# Patient Record
(demographics unavailable — no encounter records)

---

## 2025-01-10 NOTE — DISCUSSION/SUMMARY
[With Me] : with me [___ Month(s)] : in [unfilled] month(s) [EKG obtained to assist in diagnosis and management of assessed problem(s)] : EKG obtained to assist in diagnosis and management of assessed problem(s) [FreeTextEntry1] : Veena is a 52 year old female with hypothyroidism, former smoker, who initially presented to [facility] in 6/2018 with syncope and shortness of breath. She was found to have extensive bilateral PE with evidence of right heart involvement. She was admitted to the [facility], and underwent catheter assisted thrombectomy.  She has since recovered quite well.   She arrives today for cardiac clearance in preparation of Meniscus repair She has no worrisome symptoms at this time and her exercise tolerance is limited by musculoskeletal issues, particularly her knee.  ECG illustrates sinus rhythm, her B/P is not optimal but improved some by the conclusion of the visit.  Coronary CTA showed Multifocal calcified plaque in the RCA and LAD, with a calcium score of 80, though no significant epicardial stenosis. She will continue atorvastatin 10mg daily for goal LDL <70 and ASA for primary prevention every other day. She will have fasting lipid panel updated.  Latest echocardiogram demonstrates normal RV/LV function with normal LVEF. We will keep an eye on her B/Ps. Levothyroxine for the management of hypothyroidism.  There is no cardiac or medical contraindication to proceeding. She is able to stop her Eliquis 2 days hours prior to the procedure, and will restart when deemed safe from a bleeding perspective.  Routine cardiac monitoring is recommended.  Again, we stressed the importance of refraining from smoking, along with diet, weight loss and exercise.  She will follow up with me in 3 months.

## 2025-01-10 NOTE — REVIEW OF SYSTEMS
[Joint Pain] : joint pain [Joint Swelling] : joint swelling [Joint Stiffness] : joint stiffness [Negative] : Heme/Lymph [FreeTextEntry9] : KNEE

## 2025-01-10 NOTE — HISTORY OF PRESENT ILLNESS
[FreeTextEntry1] : Mrs. Richards is a 52 year old female with hypothyroidism, former smoker, DVT/PE (23 yrs ago on OCP, smoker in setting of severe pleurisy/PNA), herniated disc in back causing nerve compression and limiting mobility since 3/2018 s/p lumbar epidural steroid injection 6/15/18, who initially presented to [facility] in 6/2018 with syncope and shortness of breath. She was found to have extensive bilateral PE with evidence of right heart involvement. She was admitted to the [facility], and underwent catheter assisted thrombectomy. She was also found to have LLE DVT Her hemodynamics improved significantly, and she was continued on therapeutic Lovenox. Hospital course was complicated by wheezing in the setting of parainfluenza virus.  In 2020, CT that showed mild atelectasis. He also commented on coronary calcium, specifically of the LAD and RCA. I subsequently sent her for a coronary CTA. Multifocal calcified plaque was noted in the RCA and LAD, with a calcium score of 80. There was no significant epicardial coronary artery stenosis. The scan also demonstrated prominent bronchial arteries to suggest a small shunts, possibly related to increased pulmonary artery pressures. Echocardiogram with normal LV function in 9/2021.  She was last seen in 7/2023 At last visit, I cleared her for knee replacement. She never had this done, because they cancelled x 2. 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 previously on Topomax for nerve pain, also tried cymbalta but did not tolerate, causing weight gain (30 pounds)  She now arrives today for cardiac clearance in preparation for Meniscus repair scheduled for 1/22/25 with Dr Fredy Law at [facility]. She is doing well. She has no chest pain or difficulty breathing.  She denies dizziness, lightheadedness, near syncope and palpitations. She takes lasix rarely.

## 2025-01-10 NOTE — DISCUSSION/SUMMARY
[With Me] : with me [___ Month(s)] : in [unfilled] month(s) [EKG obtained to assist in diagnosis and management of assessed problem(s)] : EKG obtained to assist in diagnosis and management of assessed problem(s) [FreeTextEntry1] : Veena is a 52 year old female with hypothyroidism, former smoker, who initially presented to [facility] in 6/2018 with syncope and shortness of breath. She was found to have extensive bilateral PE with evidence of right heart involvement. She was admitted to the [facility], and underwent catheter assisted thrombectomy.  She has since recovered quite well.   She arrives today for cardiac clearance in preparation of Meniscus repair She has no worrisome symptoms at this time and her exercise tolerance is limited by musculoskeletal issues, particularly her knee.  ECG illustrates sinus rhythm, her B/P is not optimal but improved some by the conclusion of the visit.  Coronary CTA showed Multifocal calcified plaque in the RCA and LAD, with a calcium score of 80, though no significant epicardial stenosis. She will continue atorvastatin 10mg daily for goal LDL <70 and ASA for primary prevention every other day. She will have fasting lipid panel updated.  Latest echocardiogram demonstrates normal RV/LV function with normal LVEF. We will keep an eye on her B/Ps. Levothyroxine for the management of hypothyroidism.  There is no cardiac or medical contraindication to proceeding. She is able to stop her Eliquis 2 days hours prior to the procedure, and will restart when deemed safe from a bleeding perspective.  Routine cardiac monitoring is recommended.  Again, we stressed the importance of refraining from smoking, along with diet, weight loss and exercise.  She will follow up with me in 3 months.

## 2025-01-10 NOTE — ADDENDUM
[FreeTextEntry1] : doing well. no cardiac contraindication to proceeding with meniscus surgery can hold ac 2 days prior

## 2025-01-10 NOTE — HISTORY OF PRESENT ILLNESS
[FreeTextEntry1] : Mrs. Richards is a 52 year old female with hypothyroidism, former smoker, DVT/PE (23 yrs ago on OCP, smoker in setting of severe pleurisy/PNA), herniated disc in back causing nerve compression and limiting mobility since 3/2018 s/p lumbar epidural steroid injection 6/15/18, who initially presented to [facility] in 6/2018 with syncope and shortness of breath. She was found to have extensive bilateral PE with evidence of right heart involvement. She was admitted to the [facility], and underwent catheter assisted thrombectomy. She was also found to have LLE DVT Her hemodynamics improved significantly, and she was continued on therapeutic Lovenox. Hospital course was complicated by wheezing in the setting of parainfluenza virus.  In 2020, CT that showed mild atelectasis. He also commented on coronary calcium, specifically of the LAD and RCA. I subsequently sent her for a coronary CTA. Multifocal calcified plaque was noted in the RCA and LAD, with a calcium score of 80. There was no significant epicardial coronary artery stenosis. The scan also demonstrated prominent bronchial arteries to suggest a small shunts, possibly related to increased pulmonary artery pressures. Echocardiogram with normal LV function in 9/2021.  She was last seen in 7/2023 At last visit, I cleared her for knee replacement. She never had this done, because they cancelled x 2. 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 previously on Topomax for nerve pain, also tried cymbalta but did not tolerate, causing weight gain (30 pounds)  She now arrives today for cardiac clearance in preparation for Meniscus repair scheduled for 1/22/25 with Dr Fredy Law at [facility]. She is doing well. She has no chest pain or difficulty breathing.  She denies dizziness, lightheadedness, near syncope and palpitations. She takes lasix rarely.

## 2025-01-10 NOTE — CARDIOLOGY SUMMARY
[de-identified] : 9/9/21: sr 1/8/2025: sinus rhythm  [de-identified] : 2021: NML LV/Rv, min MR/MAC/min TR [de-identified] : 2018: CTA Cors  calcium score 80 multifocal calcified plaque causes up to midl RCA/LAD stenosis. (90 -100 percentile)

## 2025-01-10 NOTE — CARDIOLOGY SUMMARY
[de-identified] : 9/9/21: sr 1/8/2025: sinus rhythm  [de-identified] : 2021: NML LV/Rv, min MR/MAC/min TR [de-identified] : 2018: CTA Cors  calcium score 80 multifocal calcified plaque causes up to midl RCA/LAD stenosis. (90 -100 percentile)

## 2025-02-27 NOTE — DISCUSSION/SUMMARY
[de-identified] : C5-6 ACDF. L5-S1 degenerative disc disease. Cervical degenerative disc disease. Patient on blood thinners. Rotator cuff tendinitis on the right side.   She is scheduled to have a surgical fluoroscopy of the right knee with Dr. Law Discussed all options. MRI Shoulder  F/U after MRI  She is followed by sports orthopedic surgeon he will review her pelvic MRI to assess the right hip and she will forward me the findings. All options discussed including rest, medicine, home exercise, acupuncture, Chiropractic care, Physical Therapy, Pain management, and last resort surgery. All questions were answered, all alternatives discussed and the patient is in complete agreement with the treatment plan which the patient contributed to and discussed with me through the shared decision making process. Follow-up appointment as instructed. Any issues and the patient will call or come in sooner.

## 2025-02-27 NOTE — HISTORY OF PRESENT ILLNESS
[Stable] : stable [de-identified] : 52 year old female presents for evaluation of chronic neck and lower back pain, last seen in Aug 2024.  She is s/p ACDF several years ago with me. She is a long term patient of mine.  She also receives care for her bilateral hips, knees, and upper extremities by other physicians. She has pain that radiates down the LLE anteriorly to the knee.  She lately has been having pain at the right side of her neck that radiates to the clavicle, trapezius to the shoulder and to the elbow.  She notes that she has pain with lifting her arm.  She takes tylenol for the pain.  Can not take NSAIDs as she is on blood thinners.  She is unsure if her pain is stemming from the neck or shoulder.  She states she has been participating with chiropractic care since 2018 till December 2023 for the neck and lower back. She also has been performing daily cervical and lumbar HEP as taught to her by her chiropractor but has not felt improvement. Has MRI Cervical and MRI Lumbar done in March 2024.  PMHx: history of DVT on AC, S/p carpal and cubital tunnel release in July 2023 with Dr. Mata.

## 2025-02-27 NOTE — PHYSICAL EXAM
[Normal] : Gait: normal [Ashby's Sign] : negative Ashby's sign [Pronator Drift] : negative pronator drift [SLR] : negative straight leg raise [de-identified] : 5 out of 5 motor strength, sensation is intact and symmetrical full range of motion flexion extension and rotation, no palpatory tenderness full range of motion of hips knees shoulders and elbows (all four extremities), no atrophy, negative straight leg raise, no pathological reflexes, no swelling, normal ambulation, no apparent distress skin is intact, no palpable lymph nodes, no upper or lower extremity instability, alert and oriented x3 and normal mood. Normal finger-to nose test. No upper motor neuron findings. + impingement right shoulder.  [de-identified] : XR Left Shoulder 2 views 2/26/25 - adequate-reviewed with patient.   XR AP Lat Cervical 08/26/2024 -C5-6 ACDF, degeneration C4-5, unchanged since 2013- reviewed with patient.  XR AP Lat Lumbar 08/26/2024 -L5-S1 disc degenerative disease, right hip arthritis- reviewed with patient.   I reviewed, interpreted and clinically correlated the following outside imaging studies,    03/19/2024 MRI-CERVICAL SPINE NON CONTRAST (Mae Acevedo) HISTORY: M54.2 Cervical/ Neck Pain M79.602 Left arm pain R20.0 Numbness Lower/Upper Extremity M48.02 Spinal stenosis cervical Technique: Multiplanar, multisequence MR imaging of the cervical spine was performed without the administration of intravenous contrast. Comparison: November 6, 2021 Findings: Alignment: Alignment is preserved. There is reversal of the normal cervical lordosis. Bone marrow: No evidence of metastatic disease or acute vertebral body compression fracture. Craniocervical junction: The craniocervical junction is within normal limits. At C2-3: No significant spinal canal or neural foraminal stenosis. At C3-4: No significant spinal canal or neural foraminal stenosis. At C4-5: There is central disc protrusion mildly narrowing the spinal canal. At C5-6: There is disc bulge and central disc protrusion causing spinal canal stenosis with cord compression. There is severe bilateral neural foraminal stenosis. At C6-7: Status post anterior cervical discectomy and fusion without significant residual spinal canal neural foraminal stenosis. At C7-T1: Mild disc bulge without significant spinal canal or neural foraminal stenosis. There is no significant change from previous study. IMPRESSION: 1. At C4-5: There is central disc protrusion mildly narrowing the spinal canal. 2. At C5-6: There is disc bulge and central disc protrusion causing spinal canal stenosis with cord compression. There is severe bilateral neural foraminal stenosis. 3. At C6-7: Status post anterior cervical discectomy and fusion without significant residual spinal canal neural foraminal stenosis. 4. At C7-T1: Mild disc bulge without significant spinal canal or neural foraminal stenosis.  03/19/2024 MRI-LUMBAR SPINE NON CONTRAST (Mae Pesemperatriz) HISTORY: R20.0 Numbness Lower/Upper Extremity M79.605 Left Leg Pain M62.830 Spasm of back muscles R26.2 Difficulty Walking Technique: Multiplanar, multisequence MRI of the lumbar spine was performed without the administration of intravenous contrast. Findings: Alignment: Alignment is preserved. Conus: The conus is normal in size and signal. Bone marrow: No evidence of metastatic disease or acute vertebral body compression fracture. At L5-S1: There is disc bulge and facet arthropathy. There is moderate bilateral neural foraminal stenosis with disc contacting exiting nerve roots.. There is bilateral subarticular recess stenosis with possible impingement of descending nerve roots. At L4-5: There is a facet arthropathy and disc bulge with mild to moderate bilateral neural foraminal stenosis. There is mild spinal canal and subarticular recess stenosis. At L3-4: There is disc bulge and left foraminal/extra foraminal disc herniation with impingement of exiting left L3 nerve root. At L2-3: No significant spinal canal or neural foraminal stenosis. At L1-2: No significant spinal canal or neural foraminal stenosis. IMPRESSION: 1. At L5-S1: There is disc bulge and facet arthropathy. There is moderate bilateral neural foraminal stenosis with disc contacting exiting nerve roots. There is bilateral subarticular recess stenosis with possible impingement of descending nerve roots. 2. At L4-5: There is facet arthropathy and disc bulge with mild to moderate bilateral neural foraminal stenosis. There is mild spinal canal and subarticular recess stenosis. 3. At L3-4: There is disc bulge and left foraminal/extra foraminal disc herniation with impingement of exiting left L3 nerve root.

## 2025-02-27 NOTE — HISTORY OF PRESENT ILLNESS
[Stable] : stable [de-identified] : 52 year old female presents for evaluation of chronic neck and lower back pain, last seen in Aug 2024.  She is s/p ACDF several years ago with me. She is a long term patient of mine.  She also receives care for her bilateral hips, knees, and upper extremities by other physicians. She has pain that radiates down the LLE anteriorly to the knee.  She lately has been having pain at the right side of her neck that radiates to the clavicle, trapezius to the shoulder and to the elbow.  She notes that she has pain with lifting her arm.  She takes tylenol for the pain.  Can not take NSAIDs as she is on blood thinners.  She is unsure if her pain is stemming from the neck or shoulder.  She states she has been participating with chiropractic care since 2018 till December 2023 for the neck and lower back. She also has been performing daily cervical and lumbar HEP as taught to her by her chiropractor but has not felt improvement. Has MRI Cervical and MRI Lumbar done in March 2024.  PMHx: history of DVT on AC, S/p carpal and cubital tunnel release in July 2023 with Dr. Mata.

## 2025-02-27 NOTE — DISCUSSION/SUMMARY
[de-identified] : C5-6 ACDF. L5-S1 degenerative disc disease. Cervical degenerative disc disease. Patient on blood thinners. Rotator cuff tendinitis on the right side.   She is scheduled to have a surgical fluoroscopy of the right knee with Dr. Law Discussed all options. MRI Shoulder  F/U after MRI  She is followed by sports orthopedic surgeon he will review her pelvic MRI to assess the right hip and she will forward me the findings. All options discussed including rest, medicine, home exercise, acupuncture, Chiropractic care, Physical Therapy, Pain management, and last resort surgery. All questions were answered, all alternatives discussed and the patient is in complete agreement with the treatment plan which the patient contributed to and discussed with me through the shared decision making process. Follow-up appointment as instructed. Any issues and the patient will call or come in sooner.

## 2025-02-27 NOTE — PHYSICAL EXAM
[Normal] : Gait: normal [Ashby's Sign] : negative Ashby's sign [Pronator Drift] : negative pronator drift [SLR] : negative straight leg raise [de-identified] : 5 out of 5 motor strength, sensation is intact and symmetrical full range of motion flexion extension and rotation, no palpatory tenderness full range of motion of hips knees shoulders and elbows (all four extremities), no atrophy, negative straight leg raise, no pathological reflexes, no swelling, normal ambulation, no apparent distress skin is intact, no palpable lymph nodes, no upper or lower extremity instability, alert and oriented x3 and normal mood. Normal finger-to nose test. No upper motor neuron findings. + impingement right shoulder.  [de-identified] : XR Left Shoulder 2 views 2/26/25 - adequate-reviewed with patient.   XR AP Lat Cervical 08/26/2024 -C5-6 ACDF, degeneration C4-5, unchanged since 2013- reviewed with patient.  XR AP Lat Lumbar 08/26/2024 -L5-S1 disc degenerative disease, right hip arthritis- reviewed with patient.   I reviewed, interpreted and clinically correlated the following outside imaging studies,    03/19/2024 MRI-CERVICAL SPINE NON CONTRAST (Mae Acevedo) HISTORY: M54.2 Cervical/ Neck Pain M79.602 Left arm pain R20.0 Numbness Lower/Upper Extremity M48.02 Spinal stenosis cervical Technique: Multiplanar, multisequence MR imaging of the cervical spine was performed without the administration of intravenous contrast. Comparison: November 6, 2021 Findings: Alignment: Alignment is preserved. There is reversal of the normal cervical lordosis. Bone marrow: No evidence of metastatic disease or acute vertebral body compression fracture. Craniocervical junction: The craniocervical junction is within normal limits. At C2-3: No significant spinal canal or neural foraminal stenosis. At C3-4: No significant spinal canal or neural foraminal stenosis. At C4-5: There is central disc protrusion mildly narrowing the spinal canal. At C5-6: There is disc bulge and central disc protrusion causing spinal canal stenosis with cord compression. There is severe bilateral neural foraminal stenosis. At C6-7: Status post anterior cervical discectomy and fusion without significant residual spinal canal neural foraminal stenosis. At C7-T1: Mild disc bulge without significant spinal canal or neural foraminal stenosis. There is no significant change from previous study. IMPRESSION: 1. At C4-5: There is central disc protrusion mildly narrowing the spinal canal. 2. At C5-6: There is disc bulge and central disc protrusion causing spinal canal stenosis with cord compression. There is severe bilateral neural foraminal stenosis. 3. At C6-7: Status post anterior cervical discectomy and fusion without significant residual spinal canal neural foraminal stenosis. 4. At C7-T1: Mild disc bulge without significant spinal canal or neural foraminal stenosis.  03/19/2024 MRI-LUMBAR SPINE NON CONTRAST (Mae Pesemperatriz) HISTORY: R20.0 Numbness Lower/Upper Extremity M79.605 Left Leg Pain M62.830 Spasm of back muscles R26.2 Difficulty Walking Technique: Multiplanar, multisequence MRI of the lumbar spine was performed without the administration of intravenous contrast. Findings: Alignment: Alignment is preserved. Conus: The conus is normal in size and signal. Bone marrow: No evidence of metastatic disease or acute vertebral body compression fracture. At L5-S1: There is disc bulge and facet arthropathy. There is moderate bilateral neural foraminal stenosis with disc contacting exiting nerve roots.. There is bilateral subarticular recess stenosis with possible impingement of descending nerve roots. At L4-5: There is a facet arthropathy and disc bulge with mild to moderate bilateral neural foraminal stenosis. There is mild spinal canal and subarticular recess stenosis. At L3-4: There is disc bulge and left foraminal/extra foraminal disc herniation with impingement of exiting left L3 nerve root. At L2-3: No significant spinal canal or neural foraminal stenosis. At L1-2: No significant spinal canal or neural foraminal stenosis. IMPRESSION: 1. At L5-S1: There is disc bulge and facet arthropathy. There is moderate bilateral neural foraminal stenosis with disc contacting exiting nerve roots. There is bilateral subarticular recess stenosis with possible impingement of descending nerve roots. 2. At L4-5: There is facet arthropathy and disc bulge with mild to moderate bilateral neural foraminal stenosis. There is mild spinal canal and subarticular recess stenosis. 3. At L3-4: There is disc bulge and left foraminal/extra foraminal disc herniation with impingement of exiting left L3 nerve root.

## 2025-02-27 NOTE — ADDENDUM
[FreeTextEntry1] : This note was written by Romana Desir on 02/26/2025 acting as scribe for Dr. Phil Benito M.D.  I, Phil Benito MD, have read and attest that all the information, medical decision making and discharge instructions within are true and accurate.

## 2025-03-26 NOTE — REASON FOR VISIT
[Spouse] : spouse [Home] : at home, [unfilled] , at the time of the visit. [Medical Office: ([facility])___] : at the medical office located in  [Telehealth (audio & video)] : This visit was provided via telehealth using real-time 2-way audio visual technology. [Verbal consent obtained from patient] : the patient, [unfilled] [Follow-Up Visit] : a follow-up visit for

## 2025-03-26 NOTE — REASON FOR VISIT
[Spouse] : spouse [Home] : at home, [unfilled] , at the time of the visit. [Medical Office: ([facility])___] : at the medical office located in  [Telehealth (audio & video)] : This visit was provided via telehealth using real-time 2-way audio visual technology. [Verbal consent obtained from patient] : the patient, [unfilled] [Follow-Up Visit] : a follow-up visit for

## 2025-03-28 NOTE — HISTORY OF PRESENT ILLNESS
[Stable] : stable [de-identified] : 52 year old female presents for evaluation of chronic neck and lower back pain, last seen in Aug 2024.  She is s/p ACDF several years ago with me. She is a long term patient of mine.  She also receives care for her bilateral hips, knees, and upper extremities by other physicians. She has pain that radiates down the LLE anteriorly to the knee.  She lately has been having pain at the right side of her neck that radiates to the clavicle, trapezius to the shoulder and to the elbow.  She notes that she has pain with lifting her arm.  She takes tylenol for the pain.  Can not take NSAIDs as she is on blood thinners.  She is unsure if her pain is stemming from the neck or shoulder.  She states she has been participating with chiropractic care since 2018 till December 2023 for the neck and lower back. She also has been performing daily cervical and lumbar HEP as taught to her by her chiropractor but has not felt improvement. Has MRI Cervical and MRI Lumbar done in March 2024.  Presents today for MRI Right Shoulder review.  PMHx: history of DVT on AC, S/p carpal and cubital tunnel release in July 2023 with Dr. Mata.

## 2025-03-28 NOTE — PHYSICAL EXAM
[Normal] : Gait: normal [Ashby's Sign] : negative Ashby's sign [Pronator Drift] : negative pronator drift [SLR] : negative straight leg raise [de-identified] : 5 out of 5 motor strength, sensation is intact and symmetrical full range of motion flexion extension and rotation, no palpatory tenderness full range of motion of hips knees shoulders and elbows (all four extremities), no atrophy, negative straight leg raise, no pathological reflexes, no swelling, normal ambulation, no apparent distress skin is intact, no palpable lymph nodes, no upper or lower extremity instability, alert and oriented x3 and normal mood. Normal finger-to nose test. No upper motor neuron findings. + impingement right shoulder.  [de-identified] :  03/24/2025 EXAM: MRI-RIGHT SHOULDER NON CONTRAST   (Mae Acevedo)  HISTORY: Atraumatic right shoulder pain. COMPARISON: No prior studies are available for comparison. TECHNIQUE: MR imaging of the right shoulder was performed without IV contrast on a 0.55 Lexi strength MRI unit,  utilizing the following pulse sequences: Coronal oblique proton density with and without fat suppression,  axial proton density and gradient echo, sagittal oblique proton density fat-suppressed. FINDINGS: Bones/Osseous: No fracture or marrow signal abnormality. Glenohumeral Joint: Intact and unremarkable AC joint/Bursa: Hypertrophic changes at the AC joint closely approximate exhibit no mass effect on the  underlying rotator cuff mechanism. The undersurface of the acromion is flat, type I. no fluid collections  in the subacromial/subdeltoid bursa. Rotator cuff: Small/tiny undersurface tear involving posterior insertional fibers of the supraspinatus  tendon, as seen about coronal image 10, series 8. No full-thickness or retractile rotator cuff tear. The  infraspinatus, teres minor, and subscapularis tendons appear grossly intact. No muscular atrophy Biceps tendon: Intact and well seated in the bicipital groove.. Glenoid labrum: The labrum is not optimally evaluated without intra-articular contrast, but no obvious  tear demonstrated. Increased signal within the posterosuperior labrum, likely degenerative.. Articular cartilage: No focal defects. Suprascapular/spinoglenoid notches: Unremarkable. Other/miscellaneous: None. IMPRESSION: 1. Small/tiny undersurface tear involving posterior insertional fibers of the supraspinatus tendon. No  full-thickness or retractile rotator cuff tear. 2. Mild hypertrophic changes at the AC joint. No mass effect on the underlying rotator cuff mechanism.   XR Left Shoulder 2 views 2/26/25 - adequate-reviewed with patient.   XR AP Lat Cervical 08/26/2024 -C5-6 ACDF, degeneration C4-5, unchanged since 2013- reviewed with patient.  XR AP Lat Lumbar 08/26/2024 -L5-S1 disc degenerative disease, right hip arthritis- reviewed with patient.   I reviewed, interpreted and clinically correlated the following outside imaging studies,    03/19/2024 MRI-CERVICAL SPINE NON CONTRAST (Mae Acevedo) HISTORY: M54.2 Cervical/ Neck Pain M79.602 Left arm pain R20.0 Numbness Lower/Upper Extremity M48.02 Spinal stenosis cervical Technique: Multiplanar, multisequence MR imaging of the cervical spine was performed without the administration of intravenous contrast. Comparison: November 6, 2021 Findings: Alignment: Alignment is preserved. There is reversal of the normal cervical lordosis. Bone marrow: No evidence of metastatic disease or acute vertebral body compression fracture. Craniocervical junction: The craniocervical junction is within normal limits. At C2-3: No significant spinal canal or neural foraminal stenosis. At C3-4: No significant spinal canal or neural foraminal stenosis. At C4-5: There is central disc protrusion mildly narrowing the spinal canal. At C5-6: There is disc bulge and central disc protrusion causing spinal canal stenosis with cord compression. There is severe bilateral neural foraminal stenosis. At C6-7: Status post anterior cervical discectomy and fusion without significant residual spinal canal neural foraminal stenosis. At C7-T1: Mild disc bulge without significant spinal canal or neural foraminal stenosis. There is no significant change from previous study. IMPRESSION: 1. At C4-5: There is central disc protrusion mildly narrowing the spinal canal. 2. At C5-6: There is disc bulge and central disc protrusion causing spinal canal stenosis with cord compression. There is severe bilateral neural foraminal stenosis. 3. At C6-7: Status post anterior cervical discectomy and fusion without significant residual spinal canal neural foraminal stenosis. 4. At C7-T1: Mild disc bulge without significant spinal canal or neural foraminal stenosis.  03/19/2024 MRI-LUMBAR SPINE NON CONTRAST (Mae Acevedo) HISTORY: R20.0 Numbness Lower/Upper Extremity M79.605 Left Leg Pain M62.830 Spasm of back muscles R26.2 Difficulty Walking Technique: Multiplanar, multisequence MRI of the lumbar spine was performed without the administration of intravenous contrast. Findings: Alignment: Alignment is preserved. Conus: The conus is normal in size and signal. Bone marrow: No evidence of metastatic disease or acute vertebral body compression fracture. At L5-S1: There is disc bulge and facet arthropathy. There is moderate bilateral neural foraminal stenosis with disc contacting exiting nerve roots.. There is bilateral subarticular recess stenosis with possible impingement of descending nerve roots. At L4-5: There is a facet arthropathy and disc bulge with mild to moderate bilateral neural foraminal stenosis. There is mild spinal canal and subarticular recess stenosis. At L3-4: There is disc bulge and left foraminal/extra foraminal disc herniation with impingement of exiting left L3 nerve root. At L2-3: No significant spinal canal or neural foraminal stenosis. At L1-2: No significant spinal canal or neural foraminal stenosis. IMPRESSION: 1. At L5-S1: There is disc bulge and facet arthropathy. There is moderate bilateral neural foraminal stenosis with disc contacting exiting nerve roots. There is bilateral subarticular recess stenosis with possible impingement of descending nerve roots. 2. At L4-5: There is facet arthropathy and disc bulge with mild to moderate bilateral neural foraminal stenosis. There is mild spinal canal and subarticular recess stenosis. 3. At L3-4: There is disc bulge and left foraminal/extra foraminal disc herniation with impingement of exiting left L3 nerve root.

## 2025-03-28 NOTE — DISCUSSION/SUMMARY
[de-identified] : C5-6 ACDF. L5-S1 degenerative disc disease. Cervical degenerative disc disease. Patient on blood thinners. Rotator cuff tendinitis on the right side.   She is scheduled to have a surgical fluoroscopy of the right knee with Dr. Law Discussed all options. May consider injection right shoulder.  She is followed by sports orthopedic surgeon he will review her pelvic MRI to assess the right hip and she will forward me the findings. All options discussed including rest, medicine, home exercise, acupuncture, Chiropractic care, Physical Therapy, Pain management, and last resort surgery. All questions were answered, all alternatives discussed and the patient is in complete agreement with the treatment plan which the patient contributed to and discussed with me through the shared decision making process. Follow-up appointment as instructed. Any issues and the patient will call or come in sooner.

## 2025-03-28 NOTE — PHYSICAL EXAM
[Normal] : Gait: normal [Ashby's Sign] : negative Ashby's sign [Pronator Drift] : negative pronator drift [SLR] : negative straight leg raise [de-identified] : 5 out of 5 motor strength, sensation is intact and symmetrical full range of motion flexion extension and rotation, no palpatory tenderness full range of motion of hips knees shoulders and elbows (all four extremities), no atrophy, negative straight leg raise, no pathological reflexes, no swelling, normal ambulation, no apparent distress skin is intact, no palpable lymph nodes, no upper or lower extremity instability, alert and oriented x3 and normal mood. Normal finger-to nose test. No upper motor neuron findings. + impingement right shoulder.  [de-identified] :  03/24/2025 EXAM: MRI-RIGHT SHOULDER NON CONTRAST   (Mae Acevedo)  HISTORY: Atraumatic right shoulder pain. COMPARISON: No prior studies are available for comparison. TECHNIQUE: MR imaging of the right shoulder was performed without IV contrast on a 0.55 Lexi strength MRI unit,  utilizing the following pulse sequences: Coronal oblique proton density with and without fat suppression,  axial proton density and gradient echo, sagittal oblique proton density fat-suppressed. FINDINGS: Bones/Osseous: No fracture or marrow signal abnormality. Glenohumeral Joint: Intact and unremarkable AC joint/Bursa: Hypertrophic changes at the AC joint closely approximate exhibit no mass effect on the  underlying rotator cuff mechanism. The undersurface of the acromion is flat, type I. no fluid collections  in the subacromial/subdeltoid bursa. Rotator cuff: Small/tiny undersurface tear involving posterior insertional fibers of the supraspinatus  tendon, as seen about coronal image 10, series 8. No full-thickness or retractile rotator cuff tear. The  infraspinatus, teres minor, and subscapularis tendons appear grossly intact. No muscular atrophy Biceps tendon: Intact and well seated in the bicipital groove.. Glenoid labrum: The labrum is not optimally evaluated without intra-articular contrast, but no obvious  tear demonstrated. Increased signal within the posterosuperior labrum, likely degenerative.. Articular cartilage: No focal defects. Suprascapular/spinoglenoid notches: Unremarkable. Other/miscellaneous: None. IMPRESSION: 1. Small/tiny undersurface tear involving posterior insertional fibers of the supraspinatus tendon. No  full-thickness or retractile rotator cuff tear. 2. Mild hypertrophic changes at the AC joint. No mass effect on the underlying rotator cuff mechanism.   XR Left Shoulder 2 views 2/26/25 - adequate-reviewed with patient.   XR AP Lat Cervical 08/26/2024 -C5-6 ACDF, degeneration C4-5, unchanged since 2013- reviewed with patient.  XR AP Lat Lumbar 08/26/2024 -L5-S1 disc degenerative disease, right hip arthritis- reviewed with patient.   I reviewed, interpreted and clinically correlated the following outside imaging studies,    03/19/2024 MRI-CERVICAL SPINE NON CONTRAST (Mae Acevedo) HISTORY: M54.2 Cervical/ Neck Pain M79.602 Left arm pain R20.0 Numbness Lower/Upper Extremity M48.02 Spinal stenosis cervical Technique: Multiplanar, multisequence MR imaging of the cervical spine was performed without the administration of intravenous contrast. Comparison: November 6, 2021 Findings: Alignment: Alignment is preserved. There is reversal of the normal cervical lordosis. Bone marrow: No evidence of metastatic disease or acute vertebral body compression fracture. Craniocervical junction: The craniocervical junction is within normal limits. At C2-3: No significant spinal canal or neural foraminal stenosis. At C3-4: No significant spinal canal or neural foraminal stenosis. At C4-5: There is central disc protrusion mildly narrowing the spinal canal. At C5-6: There is disc bulge and central disc protrusion causing spinal canal stenosis with cord compression. There is severe bilateral neural foraminal stenosis. At C6-7: Status post anterior cervical discectomy and fusion without significant residual spinal canal neural foraminal stenosis. At C7-T1: Mild disc bulge without significant spinal canal or neural foraminal stenosis. There is no significant change from previous study. IMPRESSION: 1. At C4-5: There is central disc protrusion mildly narrowing the spinal canal. 2. At C5-6: There is disc bulge and central disc protrusion causing spinal canal stenosis with cord compression. There is severe bilateral neural foraminal stenosis. 3. At C6-7: Status post anterior cervical discectomy and fusion without significant residual spinal canal neural foraminal stenosis. 4. At C7-T1: Mild disc bulge without significant spinal canal or neural foraminal stenosis.  03/19/2024 MRI-LUMBAR SPINE NON CONTRAST (Mae Acevedo) HISTORY: R20.0 Numbness Lower/Upper Extremity M79.605 Left Leg Pain M62.830 Spasm of back muscles R26.2 Difficulty Walking Technique: Multiplanar, multisequence MRI of the lumbar spine was performed without the administration of intravenous contrast. Findings: Alignment: Alignment is preserved. Conus: The conus is normal in size and signal. Bone marrow: No evidence of metastatic disease or acute vertebral body compression fracture. At L5-S1: There is disc bulge and facet arthropathy. There is moderate bilateral neural foraminal stenosis with disc contacting exiting nerve roots.. There is bilateral subarticular recess stenosis with possible impingement of descending nerve roots. At L4-5: There is a facet arthropathy and disc bulge with mild to moderate bilateral neural foraminal stenosis. There is mild spinal canal and subarticular recess stenosis. At L3-4: There is disc bulge and left foraminal/extra foraminal disc herniation with impingement of exiting left L3 nerve root. At L2-3: No significant spinal canal or neural foraminal stenosis. At L1-2: No significant spinal canal or neural foraminal stenosis. IMPRESSION: 1. At L5-S1: There is disc bulge and facet arthropathy. There is moderate bilateral neural foraminal stenosis with disc contacting exiting nerve roots. There is bilateral subarticular recess stenosis with possible impingement of descending nerve roots. 2. At L4-5: There is facet arthropathy and disc bulge with mild to moderate bilateral neural foraminal stenosis. There is mild spinal canal and subarticular recess stenosis. 3. At L3-4: There is disc bulge and left foraminal/extra foraminal disc herniation with impingement of exiting left L3 nerve root.

## 2025-03-28 NOTE — HISTORY OF PRESENT ILLNESS
[Stable] : stable [de-identified] : 52 year old female presents for evaluation of chronic neck and lower back pain, last seen in Aug 2024.  She is s/p ACDF several years ago with me. She is a long term patient of mine.  She also receives care for her bilateral hips, knees, and upper extremities by other physicians. She has pain that radiates down the LLE anteriorly to the knee.  She lately has been having pain at the right side of her neck that radiates to the clavicle, trapezius to the shoulder and to the elbow.  She notes that she has pain with lifting her arm.  She takes tylenol for the pain.  Can not take NSAIDs as she is on blood thinners.  She is unsure if her pain is stemming from the neck or shoulder.  She states she has been participating with chiropractic care since 2018 till December 2023 for the neck and lower back. She also has been performing daily cervical and lumbar HEP as taught to her by her chiropractor but has not felt improvement. Has MRI Cervical and MRI Lumbar done in March 2024.  Presents today for MRI Right Shoulder review.  PMHx: history of DVT on AC, S/p carpal and cubital tunnel release in July 2023 with Dr. Mata.

## 2025-03-28 NOTE — DISCUSSION/SUMMARY
[de-identified] : C5-6 ACDF. L5-S1 degenerative disc disease. Cervical degenerative disc disease. Patient on blood thinners. Rotator cuff tendinitis on the right side.   She is scheduled to have a surgical fluoroscopy of the right knee with Dr. Law Discussed all options. May consider injection right shoulder.  She is followed by sports orthopedic surgeon he will review her pelvic MRI to assess the right hip and she will forward me the findings. All options discussed including rest, medicine, home exercise, acupuncture, Chiropractic care, Physical Therapy, Pain management, and last resort surgery. All questions were answered, all alternatives discussed and the patient is in complete agreement with the treatment plan which the patient contributed to and discussed with me through the shared decision making process. Follow-up appointment as instructed. Any issues and the patient will call or come in sooner.

## 2025-05-13 NOTE — CARDIOLOGY SUMMARY
[de-identified] : 9/9/21: sr 1/8/2025: sinus rhythm  [de-identified] : 2021: NML LV/Rv, min MR/MAC/min TR [de-identified] : 2018: CTA Cors  calcium score 80 multifocal calcified plaque causes up to midl RCA/LAD stenosis. (90 -100 percentile)

## 2025-05-13 NOTE — HISTORY OF PRESENT ILLNESS
[FreeTextEntry1] : Mrs. Richards is a 52 year old female with hypothyroidism, former smoker, DVT/PE (23 yrs ago on OCP, smoker in setting of severe pleurisy/PNA), herniated disc in back causing nerve compression and limiting mobility since 3/2018 s/p lumbar epidural steroid injection 6/15/18, who initially presented to [facility] in 6/2018 with syncope and shortness of breath. She was found to have extensive bilateral PE with evidence of right heart involvement. She was admitted to [facility], and underwent catheter assisted thrombectomy. She was also found to have LLE DVT Her hemodynamics improved significantly, and she was continued on therapeutic Lovenox. Hospital course was complicated by wheezing in the setting of parainfluenza virus.  In 2020, CT that showed mild atelectasis. He also commented on coronary calcium, specifically of the LAD and RCA. I subsequently sent her for a coronary CTA. Multifocal calcified plaque was noted in the RCA and LAD, with a calcium score of 80. There was no significant epicardial coronary artery stenosis. The scan also demonstrated prominent bronchial arteries to suggest a small shunts, possibly related to increased pulmonary artery pressures. Echocardiogram with normal LV function in 9/2021.  In 2023, I cleared her for knee replacement. She never had this done, because they cancelled x 2. 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 previously on Topomax for nerve pain, also tried cymbalta but did not tolerate, causing weight gain (30 pounds)  I last saw her in 1/25. She is now s/p meniscus repair on 4/30/25. She is doing well. She has no exertional chest pain or dyspnea. She reports no palpitations She has episode of feeling off. She does have some dizziness with positional change.  She is not taking a diuretic.

## 2025-05-13 NOTE — DISCUSSION/SUMMARY
[With Me] : with me [___ Month(s)] : in [unfilled] month(s) [FreeTextEntry1] : Veena is a 52 year old female with hypothyroidism, former smoker, who initially presented to [facility] in 6/2018 with syncope and shortness of breath. She was found to have extensive bilateral PE with evidence of right heart involvement. She was admitted to [facility], and underwent catheter assisted thrombectomy.   Overall. she is doing ok, though reports feeling off with certain positions. She is not significantly orthostatic, though I think her symptoms may be BP driven.  She will increase her water intake, and try compression socks when cleared by her knee surgeon.  Coronary CTA showed Multifocal calcified plaque in the RCA and LAD, with a calcium score of 80, though no significant epicardial stenosis. She will continue atorvastatin 10mg daily for goal LDL <70 and ASA for primary prevention every other day.  We will repeat an echocardiogram and carotid doppler this year. Again, we stressed the importance of refraining from smoking, along with diet, weight loss and exercise.  She will follow up with me in 6 months. [EKG obtained to assist in diagnosis and management of assessed problem(s)] : EKG obtained to assist in diagnosis and management of assessed problem(s)